# Patient Record
Sex: FEMALE | Race: WHITE | Employment: FULL TIME | URBAN - METROPOLITAN AREA
[De-identification: names, ages, dates, MRNs, and addresses within clinical notes are randomized per-mention and may not be internally consistent; named-entity substitution may affect disease eponyms.]

---

## 2017-07-27 ENCOUNTER — OFFICE VISIT (OUTPATIENT)
Dept: FAMILY MEDICINE CLINIC | Age: 32
End: 2017-07-27

## 2017-07-27 VITALS
HEIGHT: 67 IN | OXYGEN SATURATION: 99 % | SYSTOLIC BLOOD PRESSURE: 118 MMHG | HEART RATE: 77 BPM | WEIGHT: 130 LBS | BODY MASS INDEX: 20.4 KG/M2 | TEMPERATURE: 99.3 F | RESPIRATION RATE: 16 BRPM | DIASTOLIC BLOOD PRESSURE: 68 MMHG

## 2017-07-27 DIAGNOSIS — F41.0 PANIC ATTACK AS REACTION TO STRESS: ICD-10-CM

## 2017-07-27 DIAGNOSIS — F51.02 ADJUSTMENT INSOMNIA: ICD-10-CM

## 2017-07-27 DIAGNOSIS — F43.0 PANIC ATTACK AS REACTION TO STRESS: ICD-10-CM

## 2017-07-27 DIAGNOSIS — F43.22 ADJUSTMENT DISORDER WITH ANXIETY: Primary | ICD-10-CM

## 2017-07-27 PROCEDURE — 99202 OFFICE O/P NEW SF 15 MIN: CPT | Performed by: NURSE PRACTITIONER

## 2017-07-27 RX ORDER — BUSPIRONE HYDROCHLORIDE 10 MG/1
10 TABLET ORAL 3 TIMES DAILY PRN
Qty: 90 TABLET | Refills: 0 | Status: SHIPPED | OUTPATIENT
Start: 2017-07-27

## 2017-07-27 ASSESSMENT — ENCOUNTER SYMPTOMS
ABDOMINAL PAIN: 0
CONSTIPATION: 0
VISUAL CHANGE: 0

## 2017-07-27 ASSESSMENT — PATIENT HEALTH QUESTIONNAIRE - PHQ9
2. FEELING DOWN, DEPRESSED OR HOPELESS: 1
SUM OF ALL RESPONSES TO PHQ9 QUESTIONS 1 & 2: 1
SUM OF ALL RESPONSES TO PHQ QUESTIONS 1-9: 1
1. LITTLE INTEREST OR PLEASURE IN DOING THINGS: 0
SUM OF ALL RESPONSES TO PHQ QUESTIONS 1-9: 0
2. FEELING DOWN, DEPRESSED OR HOPELESS: 0
1. LITTLE INTEREST OR PLEASURE IN DOING THINGS: 0
SUM OF ALL RESPONSES TO PHQ9 QUESTIONS 1 & 2: 0

## 2024-04-30 NOTE — PROGRESS NOTES
Subjective   Patient ID: Adore Del Valle is a 38 y.o. female who presents for Establish Care.  HPI  Patient presents today to establish new care. Previous PCP was Dr. Lisa Musa. No longer goes there due to physician not in practice anymore. [place] about us from mother, sister, and everyone she works with.    Patient would like to have blood work done.     Patient thinks she may have high blood pressure.     Patient states she feels exhausted everyday. Patient states she can fall asleep in a pin drop and if she closes her eyes she will fall asleep instantly.    Would like to talk about going on a anxiety medication. Previously on Lexapro but patient took herself off of it.    Has no other new problem /question.    Review of Systems  Constitutional:  no chills, no fever and no night sweats.  Eyes: no blurred vision and no eyesight problems.  ENT: no hearing loss, no nasal congestion, no hoarseness and no sore throat.  Neck: no mass (es) and no swelling.  Cardiovascular: no chest pain, no intermittent leg claudication, no lower extremity edema, no palpitation and no syncope.  Respiratory: no cough, no shortness of breath during exertion, no shortness of breath at rest and no wheezing.  Gastrointestinal: no abdominal pain, no blood in stools, no constipation, no diarrhea, no melena, no nausea, no rectal pain and no vomiting.  Genitourinary: no dysuria, no change in urinary frequency, no urinary hesitancy and no feelings of urinary urgency.  Musculoskeletal: no arthralgias, no back pain and no myalgias.  Integumentary: no new skin lesions and no rashes.  Neurological: no difficulty walking, no headache, no limb weakness, no numbness and no tingling.  Psychiatric/Behavioral: no anxiety, no depression, no anhedonia and no substance use disorders.  Endocrine: no recent weight gain and no recent weight loss.  Hematologic/Lymphatic: no tendency for easy bruising and no swollen glands    Objective   Physical  Exam  Patient in [facility].  History of anxiety in the past patient is stable on current insomnia control treatment plan with no issues or significant side effects.  They understand the risks associated with chronic insomnia medication use and possibility of dependence.  They understand to use the insomnia medication only as directed and minimize its use when possible.  Current Treatment plan provides adequate insomnia control in order to maintain a stable   functional daily life.  Falls asleep has trouble staying asleep feels fatigued and tired all the time works in as a   on her feet constantly works a lot of hours  her  still involved they have 9-year-old twins he is involved in her and shared parenting.  Overall she is doing well had been on Lexapro in the past and then started not to work as well as been off it for a year feels like not sure if anxiety is worse just due to the lack of sleep.  She has not tried anything for sleep.  Exam shows a well-developed well-nourished thin female no acute distress.  Physical exam today's office visit constitutional alert and oriented x3.    Head is atraumatic HEENT is within normal limits.    Neck supple no masses full range of motion.    Thyroid is normal in size no thyromegaly there is no carotid bruits.    Pulmonary exam shows clear to auscultation no respiratory distress.    Cardiovascular shows no murmur rub or gallop.  Regular rate and rhythm.    Abdominal exam soft nontender no hepatosplenomegaly or masses normal bowel sounds no rebound no guarding.    Musculoskeletal exam no joint pain no muscle pain full range of motion.    Psych exam normal mood and affect.    Dermatologic exam no skin lesions no rash no blemishes.    Neuro exam is no focal deficits.  Normal exam.    Extremities no edema normal pulses normal capillary refill.  Plan is to get blood drawn and have her try over-the-counter melatonin up to 10 mg  /82 (BP  "Location: Left arm, Patient Position: Sitting)   Pulse 88   Temp 36.6 °C (97.9 °F) (Temporal)   Ht 1.702 m (5' 7\")   Wt 66.6 kg (146 lb 12.8 oz)   SpO2 99%   BMI 22.99 kg/m²     No results found for: \"WBC\", \"HGB\", \"HCT\", \"MCV\", \"PLT\"    Assessment/Plan if not improving may need to try prescription medication to help with sleep no evidence of sleep apnea at this point.  Follow-up when we have her blood test results we may need to get her started back on something for anxiety if is not improved after getting better sleep.  Problem List Items Addressed This Visit    None  Visit Diagnoses       Other fatigue        Need for lipid screening        BMI 22.0-22.9, adult                "

## 2024-05-01 ENCOUNTER — OFFICE VISIT (OUTPATIENT)
Dept: PRIMARY CARE | Facility: CLINIC | Age: 39
End: 2024-05-01
Payer: COMMERCIAL

## 2024-05-01 VITALS
SYSTOLIC BLOOD PRESSURE: 110 MMHG | HEART RATE: 88 BPM | TEMPERATURE: 97.9 F | OXYGEN SATURATION: 99 % | HEIGHT: 67 IN | BODY MASS INDEX: 23.04 KG/M2 | WEIGHT: 146.8 LBS | DIASTOLIC BLOOD PRESSURE: 82 MMHG

## 2024-05-01 DIAGNOSIS — Z13.220 NEED FOR LIPID SCREENING: ICD-10-CM

## 2024-05-01 DIAGNOSIS — R53.83 OTHER FATIGUE: ICD-10-CM

## 2024-05-01 PROCEDURE — 99213 OFFICE O/P EST LOW 20 MIN: CPT | Performed by: FAMILY MEDICINE

## 2024-05-01 PROCEDURE — 1036F TOBACCO NON-USER: CPT | Performed by: FAMILY MEDICINE

## 2024-05-01 PROCEDURE — 3008F BODY MASS INDEX DOCD: CPT | Performed by: FAMILY MEDICINE

## 2024-05-01 ASSESSMENT — PATIENT HEALTH QUESTIONNAIRE - PHQ9
2. FEELING DOWN, DEPRESSED OR HOPELESS: NOT AT ALL
1. LITTLE INTEREST OR PLEASURE IN DOING THINGS: NOT AT ALL
SUM OF ALL RESPONSES TO PHQ9 QUESTIONS 1 AND 2: 0

## 2024-05-06 ENCOUNTER — LAB (OUTPATIENT)
Dept: LAB | Facility: LAB | Age: 39
End: 2024-05-06
Payer: COMMERCIAL

## 2024-05-06 ENCOUNTER — TELEPHONE (OUTPATIENT)
Dept: PRIMARY CARE | Facility: CLINIC | Age: 39
End: 2024-05-06

## 2024-05-06 DIAGNOSIS — R53.83 OTHER FATIGUE: ICD-10-CM

## 2024-05-06 DIAGNOSIS — Z13.220 NEED FOR LIPID SCREENING: ICD-10-CM

## 2024-05-06 DIAGNOSIS — D64.9 ANEMIA, UNSPECIFIED TYPE: ICD-10-CM

## 2024-05-06 LAB
ALBUMIN SERPL BCP-MCNC: 4.4 G/DL (ref 3.4–5)
ALP SERPL-CCNC: 42 U/L (ref 33–110)
ALT SERPL W P-5'-P-CCNC: 10 U/L (ref 7–45)
ANION GAP SERPL CALC-SCNC: 10 MMOL/L (ref 10–20)
AST SERPL W P-5'-P-CCNC: 13 U/L (ref 9–39)
BASOPHILS # BLD AUTO: 0.05 X10*3/UL (ref 0–0.1)
BASOPHILS NFR BLD AUTO: 0.6 %
BILIRUB SERPL-MCNC: 0.6 MG/DL (ref 0–1.2)
BUN SERPL-MCNC: 10 MG/DL (ref 6–23)
CALCIUM SERPL-MCNC: 9.5 MG/DL (ref 8.6–10.3)
CHLORIDE SERPL-SCNC: 108 MMOL/L (ref 98–107)
CHOLEST SERPL-MCNC: 183 MG/DL (ref 0–199)
CHOLESTEROL/HDL RATIO: 2.8
CO2 SERPL-SCNC: 27 MMOL/L (ref 21–32)
CREAT SERPL-MCNC: 0.61 MG/DL (ref 0.5–1.05)
EGFRCR SERPLBLD CKD-EPI 2021: >90 ML/MIN/1.73M*2
EOSINOPHIL # BLD AUTO: 0.09 X10*3/UL (ref 0–0.7)
EOSINOPHIL NFR BLD AUTO: 1.1 %
ERYTHROCYTE [DISTWIDTH] IN BLOOD BY AUTOMATED COUNT: 15.6 % (ref 11.5–14.5)
FOLATE SERPL-MCNC: 9.2 NG/ML
GLUCOSE SERPL-MCNC: 97 MG/DL (ref 74–99)
HCT VFR BLD AUTO: 38.1 % (ref 36–46)
HDLC SERPL-MCNC: 64.5 MG/DL
HGB BLD-MCNC: 11.7 G/DL (ref 12–16)
IMM GRANULOCYTES # BLD AUTO: 0.03 X10*3/UL (ref 0–0.7)
IMM GRANULOCYTES NFR BLD AUTO: 0.4 % (ref 0–0.9)
IRON SATN MFR SERPL: 11 % (ref 25–45)
IRON SERPL-MCNC: 43 UG/DL (ref 35–150)
LDLC SERPL CALC-MCNC: 110 MG/DL
LYMPHOCYTES # BLD AUTO: 1.85 X10*3/UL (ref 1.2–4.8)
LYMPHOCYTES NFR BLD AUTO: 23.1 %
MCH RBC QN AUTO: 25.2 PG (ref 26–34)
MCHC RBC AUTO-ENTMCNC: 30.7 G/DL (ref 32–36)
MCV RBC AUTO: 82 FL (ref 80–100)
MONOCYTES # BLD AUTO: 0.44 X10*3/UL (ref 0.1–1)
MONOCYTES NFR BLD AUTO: 5.5 %
NEUTROPHILS # BLD AUTO: 5.56 X10*3/UL (ref 1.2–7.7)
NEUTROPHILS NFR BLD AUTO: 69.3 %
NON HDL CHOLESTEROL: 119 MG/DL (ref 0–149)
NRBC BLD-RTO: 0 /100 WBCS (ref 0–0)
PLATELET # BLD AUTO: 395 X10*3/UL (ref 150–450)
POTASSIUM SERPL-SCNC: 4.5 MMOL/L (ref 3.5–5.3)
PROT SERPL-MCNC: 7.1 G/DL (ref 6.4–8.2)
RBC # BLD AUTO: 4.64 X10*6/UL (ref 4–5.2)
SODIUM SERPL-SCNC: 140 MMOL/L (ref 136–145)
T4 FREE SERPL-MCNC: 0.66 NG/DL (ref 0.61–1.12)
TIBC SERPL-MCNC: 397 UG/DL (ref 240–445)
TRIGL SERPL-MCNC: 42 MG/DL (ref 0–149)
UIBC SERPL-MCNC: 354 UG/DL (ref 110–370)
VIT B12 SERPL-MCNC: 451 PG/ML (ref 211–911)
VLDL: 8 MG/DL (ref 0–40)
WBC # BLD AUTO: 8 X10*3/UL (ref 4.4–11.3)

## 2024-05-06 PROCEDURE — 85025 COMPLETE CBC W/AUTO DIFF WBC: CPT

## 2024-05-06 PROCEDURE — 36415 COLL VENOUS BLD VENIPUNCTURE: CPT

## 2024-05-06 PROCEDURE — 82607 VITAMIN B-12: CPT

## 2024-05-06 PROCEDURE — 82746 ASSAY OF FOLIC ACID SERUM: CPT

## 2024-05-06 PROCEDURE — 80061 LIPID PANEL: CPT

## 2024-05-06 PROCEDURE — 83540 ASSAY OF IRON: CPT

## 2024-05-06 PROCEDURE — 83550 IRON BINDING TEST: CPT

## 2024-05-06 PROCEDURE — 84439 ASSAY OF FREE THYROXINE: CPT

## 2024-05-06 PROCEDURE — 80053 COMPREHEN METABOLIC PANEL: CPT

## 2024-05-06 NOTE — TELEPHONE ENCOUNTER
----- Message from Jerel Bravo MD sent at 5/6/2024  3:51 PM EDT -----  Labs normal except for mild anemia.  She needs an iron level B12 level and folate level drawn

## 2024-05-07 ENCOUNTER — TELEPHONE (OUTPATIENT)
Dept: PRIMARY CARE | Facility: CLINIC | Age: 39
End: 2024-05-07
Payer: COMMERCIAL

## 2024-05-07 DIAGNOSIS — D64.9 ANEMIA, UNSPECIFIED TYPE: ICD-10-CM

## 2024-05-07 NOTE — TELEPHONE ENCOUNTER
Patient aware.     ----- Message from Jerel Bravo MD sent at 5/7/2024  9:03 AM EDT -----  Labs normal except for mild anemia.  Iron B12 and folate levels normal.  This may be related.  Blood loss especially if she has every periods and short cycles.  Recommend recheck CBC with differential and 2 months.

## 2024-05-16 PROBLEM — D64.9 ANEMIA: Status: ACTIVE | Noted: 2024-05-16

## 2024-05-16 NOTE — PROGRESS NOTES
Subjective   Patient ID: Adore Del Valle is a 38 y.o. female who presents for Anxiety.  HPI      Patient presents today complaining of Anxiety. This is chronic issue.  In the past was on Lexapro. Patient states it did help for a little while but would like to go on something different. Patient states once the dose was increased she felt zoned out all the time.  Wants to discuss going back on medication for anxiety.     Patient was seen on 5/6/24 and Dr. Bravo would like to follow up on anxiety if not improved after better sleep. Patient states her sleeping pattern has been a little better since starting Melatonin as advised last time. Patient states she does not stay asleep.     Has no other new problem /question.    Review of Systems  Constitutional:  no chills, no fever and no night sweats.  Eyes: no blurred vision and no eyesight problems.  ENT: no hearing loss, no nasal congestion, no hoarseness and no sore throat.  Neck: no mass (es) and no swelling.  Cardiovascular: no chest pain, no intermittent leg claudication, no lower extremity edema, no palpitation and no syncope.  Respiratory: no cough, no shortness of breath during exertion, no shortness of breath at rest and no wheezing.  Gastrointestinal: no abdominal pain, no blood in stools, no constipation, no diarrhea, no melena, no nausea, no rectal pain and no vomiting.  Genitourinary: no dysuria, no change in urinary frequency, no urinary hesitancy and no feelings of urinary urgency.  Musculoskeletal: no arthralgias, no back pain and no myalgias.  Integumentary: no new skin lesions and no rashes.  Neurological: no difficulty walking, no headache, no limb weakness, no numbness and no tingling.  Psychiatric/Behavioral: no anxiety, no depression, no anhedonia and no substance use disorders.  Endocrine: no recent weight gain and no recent weight loss.  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  They are all going to [place] next week and she has never flown before she is anxious about getting on a plane and having a panic attack.  Well-developed well-nourished woman in no acute distress had a long discussion about anxiety she had been on Lexapro in the past it helped with her anxiety but made her foggy headed and just like to try something different.  She would also like to have something in case she gets extremely anxious while waiting for the medication to kick in.  /78 (BP Location: Left arm, Patient Position: Sitting)   Pulse 83   Temp 36.2 °C (97.2 °F) (Temporal)   Ht 1.702 m (5' 7\")   Wt 65.3 kg (144 lb)   SpO2 99%   BMI 22.55 kg/m²     Lab Results   Component Value Date    WBC 8.0 05/06/2024    HGB 11.7 (L) 05/06/2024    HCT 38.1 05/06/2024    MCV 82 05/06/2024     05/06/2024       Assessment/Plan plan is to start 10 mg of Paxil nightly Valium 5 every 12 hours as necessary for acute anxiety she will follow-up with us in 3 weeks to see how she is doing.  Problem List Items Addressed This Visit          Hematology and Neoplasia    Anemia     Other Visit Diagnoses       Anxiety    -  Primary    Relevant Medications    PARoxetine (Paxil) 10 mg tablet    diazePAM (Valium) 5 mg tablet    BMI 22.0-22.9, adult                "

## 2024-05-20 ENCOUNTER — OFFICE VISIT (OUTPATIENT)
Dept: PRIMARY CARE | Facility: CLINIC | Age: 39
End: 2024-05-20
Payer: COMMERCIAL

## 2024-05-20 VITALS
DIASTOLIC BLOOD PRESSURE: 78 MMHG | TEMPERATURE: 97.2 F | WEIGHT: 144 LBS | BODY MASS INDEX: 22.6 KG/M2 | OXYGEN SATURATION: 99 % | SYSTOLIC BLOOD PRESSURE: 116 MMHG | HEART RATE: 83 BPM | HEIGHT: 67 IN

## 2024-05-20 DIAGNOSIS — D64.9 ANEMIA, UNSPECIFIED TYPE: ICD-10-CM

## 2024-05-20 DIAGNOSIS — F41.9 ANXIETY: Primary | ICD-10-CM

## 2024-05-20 PROCEDURE — 99213 OFFICE O/P EST LOW 20 MIN: CPT | Performed by: FAMILY MEDICINE

## 2024-05-20 PROCEDURE — 3008F BODY MASS INDEX DOCD: CPT | Performed by: FAMILY MEDICINE

## 2024-05-20 PROCEDURE — 1036F TOBACCO NON-USER: CPT | Performed by: FAMILY MEDICINE

## 2024-05-20 RX ORDER — PAROXETINE 10 MG/1
10 TABLET, FILM COATED ORAL EVERY MORNING
Qty: 30 TABLET | Refills: 5 | Status: SHIPPED | OUTPATIENT
Start: 2024-05-20 | End: 2024-11-16

## 2024-05-20 RX ORDER — DIAZEPAM 5 MG/1
5 TABLET ORAL 2 TIMES DAILY PRN
Qty: 30 TABLET | Refills: 1 | Status: SHIPPED | OUTPATIENT
Start: 2024-05-20 | End: 2024-06-04

## 2024-05-20 ASSESSMENT — PATIENT HEALTH QUESTIONNAIRE - PHQ9
1. LITTLE INTEREST OR PLEASURE IN DOING THINGS: NOT AT ALL
2. FEELING DOWN, DEPRESSED OR HOPELESS: NOT AT ALL
SUM OF ALL RESPONSES TO PHQ9 QUESTIONS 1 AND 2: 0

## 2024-09-18 ENCOUNTER — APPOINTMENT (OUTPATIENT)
Dept: PRIMARY CARE | Facility: CLINIC | Age: 39
End: 2024-09-18
Payer: COMMERCIAL

## 2025-01-17 DIAGNOSIS — F41.9 ANXIETY: ICD-10-CM

## 2025-01-17 RX ORDER — PAROXETINE 10 MG/1
10 TABLET, FILM COATED ORAL EVERY MORNING
Qty: 30 TABLET | Refills: 2 | Status: SHIPPED | OUTPATIENT
Start: 2025-01-17 | End: 2025-07-16

## 2025-01-17 NOTE — TELEPHONE ENCOUNTER
LL  Hello!  My Paxil has no more refills and I only have 2 days left. Can I get a refill sent over to my pharmacy?     Thanks so much!

## 2025-05-01 ENCOUNTER — OFFICE VISIT (OUTPATIENT)
Dept: PRIMARY CARE | Facility: CLINIC | Age: 40
End: 2025-05-01
Payer: COMMERCIAL

## 2025-05-01 VITALS
TEMPERATURE: 97.9 F | HEIGHT: 67 IN | DIASTOLIC BLOOD PRESSURE: 82 MMHG | WEIGHT: 137 LBS | SYSTOLIC BLOOD PRESSURE: 138 MMHG | BODY MASS INDEX: 21.5 KG/M2 | OXYGEN SATURATION: 99 % | HEART RATE: 102 BPM

## 2025-05-01 DIAGNOSIS — I47.10 PSVT (PAROXYSMAL SUPRAVENTRICULAR TACHYCARDIA) (CMS-HCC): Primary | ICD-10-CM

## 2025-05-01 PROCEDURE — 1036F TOBACCO NON-USER: CPT | Performed by: FAMILY MEDICINE

## 2025-05-01 PROCEDURE — 3008F BODY MASS INDEX DOCD: CPT | Performed by: FAMILY MEDICINE

## 2025-05-01 PROCEDURE — 99213 OFFICE O/P EST LOW 20 MIN: CPT | Performed by: FAMILY MEDICINE

## 2025-05-01 RX ORDER — METOPROLOL SUCCINATE 25 MG/1
25 TABLET, EXTENDED RELEASE ORAL DAILY
Qty: 30 TABLET | Refills: 5 | Status: SHIPPED | OUTPATIENT
Start: 2025-05-01 | End: 2025-10-28

## 2025-05-01 ASSESSMENT — PATIENT HEALTH QUESTIONNAIRE - PHQ9
SUM OF ALL RESPONSES TO PHQ9 QUESTIONS 1 AND 2: 0
2. FEELING DOWN, DEPRESSED OR HOPELESS: NOT AT ALL
1. LITTLE INTEREST OR PLEASURE IN DOING THINGS: NOT AT ALL

## 2025-05-01 NOTE — PROGRESS NOTES
Subjective   Patient ID: Adore Del Valle is a 39 y.o. female who presents for No chief complaint on file..  HPI  Patient presents in the office today with concerns of tachycardia. Patient states her HR reached 198 today. Pt HR in office is 102. Pt used to have problems with it before and was on beta blockers. Pt Previous Cardiologist passed away. Pt drinks a lot of energy drinks. Pt had this issue since she was in highschool but hasn't been on meds in a while. Pt does not have a cardiologist. Ongoing X years but getting worse. Has tried eating and drinking and calming down.  Review of Systems  Constitutional:  no chills, no fever and no night sweats.  Eyes: no blurred vision and no eyesight problems.  ENT: no hearing loss, no nasal congestion, no hoarseness and no sore throat.  Neck: no mass (es) and no swelling.  Cardiovascular: no chest pain, no intermittent leg claudication, no lower extremity edema, no palpitation and no syncope.  Respiratory: no cough, no shortness of breath during exertion, no shortness of breath at rest and no wheezing.  Gastrointestinal: no abdominal pain, no blood in stools, no constipation, no diarrhea, no melena, no nausea, no rectal pain and no vomiting.  Genitourinary: no dysuria, no change in urinary frequency, no urinary hesitancy and no feelings of urinary urgency.  Musculoskeletal: no arthralgias, no back pain and no myalgias.  Integumentary: no new skin lesions and no rashes.  Neurological: no difficulty walking, no headache, no limb weakness, no numbness and no tingling.  Psychiatric/Behavioral: no anxiety, no depression, no anhedonia and no substance use disorders.  Endocrine: no recent weight gain and no recent weight loss.  Hematologic/Lymphatic: no tendency for easy bruising and no swollen glands    Objective   Physical Exam  Seen today with complaints of palpitations she had what sounds like PSVT as a teenager it went away over the past year she is having more more  episodes of heart racing randomly she would usually get them once or twice a year not Much more often she had 2 episodes today and she is never had 2 episodes in a single day and her heart rate went up to 190+ when she was at work her boss has A-fib and he has a monitor to check heart rate.  First episode was early in the morning for she left work put her make-up on calm no exertion lasted for about 5 minutes and went away episode at work lasted for at least 20 minutes it was much more anxiety provoking.  She does admit to drinking a lot of high-powered energy drinks a lot of caffeine.  Had a long discussion about PSVT accessory bundles possible beta-blocker versus radiofrequency ablation as treatment we will start her on Toprol metoprolol XL 25 mg daily to see if we can minimize any further episodes will contact cardiology to get her seen as soon as possible and await their evaluations in the meantime she is also told to avoid caffeine completely it may be that that is all she needs to do will wait cardiology's advice.  There were no vitals taken for this visit.    Lab Results   Component Value Date    WBC 8.0 05/06/2024    HGB 11.7 (L) 05/06/2024    HCT 38.1 05/06/2024    MCV 82 05/06/2024     05/06/2024       Assessment/Plan   Problem List Items Addressed This Visit    None

## 2025-05-06 ENCOUNTER — APPOINTMENT (OUTPATIENT)
Dept: PRIMARY CARE | Facility: CLINIC | Age: 40
End: 2025-05-06
Payer: COMMERCIAL

## 2025-05-06 ENCOUNTER — APPOINTMENT (OUTPATIENT)
Dept: CARDIOLOGY | Facility: CLINIC | Age: 40
End: 2025-05-06
Payer: COMMERCIAL

## 2025-05-06 VITALS
HEIGHT: 67 IN | BODY MASS INDEX: 21.79 KG/M2 | WEIGHT: 138.8 LBS | SYSTOLIC BLOOD PRESSURE: 132 MMHG | DIASTOLIC BLOOD PRESSURE: 82 MMHG | HEART RATE: 82 BPM

## 2025-05-06 DIAGNOSIS — I47.10 PSVT (PAROXYSMAL SUPRAVENTRICULAR TACHYCARDIA) (CMS-HCC): ICD-10-CM

## 2025-05-06 DIAGNOSIS — R42 LIGHTHEADEDNESS: Primary | ICD-10-CM

## 2025-05-06 DIAGNOSIS — R00.2 PALPITATIONS: ICD-10-CM

## 2025-05-06 DIAGNOSIS — R06.02 SHORTNESS OF BREATH: ICD-10-CM

## 2025-05-06 PROCEDURE — 99215 OFFICE O/P EST HI 40 MIN: CPT | Performed by: NURSE PRACTITIONER

## 2025-05-06 PROCEDURE — 1036F TOBACCO NON-USER: CPT | Performed by: NURSE PRACTITIONER

## 2025-05-06 PROCEDURE — 93000 ELECTROCARDIOGRAM COMPLETE: CPT | Performed by: INTERNAL MEDICINE

## 2025-05-06 PROCEDURE — 3008F BODY MASS INDEX DOCD: CPT | Performed by: NURSE PRACTITIONER

## 2025-05-06 NOTE — PROGRESS NOTES
Chief Complaint:  Chief Complaint   Patient presents with    New Patient Visit     Pt states she is having palpations. She was on BB as teenager for same symptoms.       Adore Del Valle is a 39 y.o. female that presents to the office today for new patient cardiac evaluation. She was referred by her primary care physician Dr. Bravo for palpitations/tachycardia. She has a PMH of fatigue, palpitations.  She denies tobacco use, vaping or recreational drug use.  Admits to occasional/social alcohol use. She admits to consuming 3 cups coffee daily along with 1-2 energy drinks daily.  She does admit to drinking water/staying well hydrated. Reports eating healthy.  Exercises on a regular basis.  She is employed full time as a .  She has 11 year old twins. Family history positive for father with Cad/MI x 2, Mother HTN, HLD, sister HLD.    She reports that she first started having palpitations in high school with heart rates 160's while sleeping.  She was placed on a beta blocker at that time, eventually her palpitations stopped and she stopped the beta blocker due to fatigue.      She reports that her palpitations have started reoccurring are feels that they are becoming more frequent and lasting for longer periods of time.  She states that last Thursday her HR was 198 bpm per her watch and another occasion 183 bpm.  She states previous her palpitations lasted 5-10 minutes and now feels they can last up to 2 hours. She does report shortness of breath and dizziness with palpitations.   She was recently started on metoprolol by her PCP which she has not started yet. She as been advised to take a metoprolol if palpitations/tachycardia reoccur.         Testing Reviewed  EKG obtained in the office today and verified with Dr. Emanuel shows NSR, possible left atrial enlargement, HR 82  bpm, QT/Qtc 372/434.     CBC:   Lab Results   Component Value Date    WBC 8.0 05/06/2024    RBC 4.64 05/06/2024    HGB 11.7 (L)  05/06/2024    HCT 38.1 05/06/2024     05/06/2024        CMP:    Lab Results   Component Value Date     05/06/2024    K 4.5 05/06/2024     (H) 05/06/2024    CO2 27 05/06/2024    BUN 10 05/06/2024    CREATININE 0.61 05/06/2024    GLUCOSE 97 05/06/2024    CALCIUM 9.5 05/06/2024       Lipid Profile:    Lab Results   Component Value Date    TRIG 42 05/06/2024    HDL 64.5 05/06/2024    LDLCALC 110 (H) 05/06/2024       BMP:  Lab Results   Component Value Date     05/06/2024    K 4.5 05/06/2024     (H) 05/06/2024    CO2 27 05/06/2024    BUN 10 05/06/2024    CREATININE 0.61 05/06/2024       CBC:  Lab Results   Component Value Date    WBC 8.0 05/06/2024    RBC 4.64 05/06/2024    HGB 11.7 (L) 05/06/2024    HCT 38.1 05/06/2024    MCV 82 05/06/2024    MCH 25.2 (L) 05/06/2024    MCHC 30.7 (L) 05/06/2024    RDW 15.6 (H) 05/06/2024     05/06/2024       Hepatic Function Panel:    Lab Results   Component Value Date    ALKPHOS 42 05/06/2024    ALT 10 05/06/2024    AST 13 05/06/2024    PROT 7.1 05/06/2024    BILITOT 0.6 05/06/2024         Problem List[1]    Social History[2]    Medical History[3]    Current Medications[4]    Cephalosporins and Propoxyphene n-acetaminophen    Family History[5]    Surgical History[6]       Review of systems  Constitutional: No weight loss, fever, chills, weakness or fatigue  HEENT: No visual loss, blurred vision, double vision or yellow sclerae  Skin: No rash or itching  Cardiovascular: No chest pain, pressure or discomfort,  edema. Positive for palpitations.   Respiratory: Positive for shortness of breath with palpitations, denies cough or sputum  Gastrointestinal: No nausea, vomiting or diarrhea. No bloody or dark tarry stools.  Neurological: No headache, syncope. Positive for lightheadedness, dizziness with palpitations.   Musculoskeletal: No muscle, back pain, joint pain or stiffness.  Hematologic: No anemia, bleeding or bruising.    /82 (BP Location: Left  "arm, Patient Position: Sitting)   Pulse 82   Ht 1.702 m (5' 7\")   Wt 63 kg (138 lb 12.8 oz)   BMI 21.74 kg/m²     Physical Exam  Constitutional: Well developed, awake/alert x 3, no distress.  Head/Neck: No JVD, No bruits  Respiratory/Thorax: patent airways, CTAB, normal breath sounds with good expansion.  Cardiovascular: Regular rate and rhythm, no murmurs, normal S1 and S2,   Gastrointestinal: Non distended, soft, non-tender, no rebound tenderness or guarding.  Extremities: No cyanosis, edema.   Neurological: Alert and oriented x 3. Moves extremities spontaneous with purpose.  Psychological: Appropriate mood and behavior  Skin: Warm and Dry. No lesions or rashes.     Assessment/Plan  Palpitations/tachycardia:  obtain 7 day charlie of [employer] monitor to assess for atrial/ventricular arrhythmias.   Shortness of breath: Obtain echocardiogram to assess for valve and pump function.   She has been advised to STOP energy drinks.  Hydration has been emphasized.   Obtain labs:  CMP, CBC, Lipid panel, Mag, TSH, Free T4  Follow in office after testing for results and further recommendations.           Please excuse any errors in grammar or translation related to dictation, voice recognition software was used to prepare this document.         [1]   Patient Active Problem List  Diagnosis    Anemia   [2]   Social History  Tobacco Use    Smoking status: Never    Smokeless tobacco: Never   Vaping Use    Vaping status: Never Used   Substance Use Topics    Alcohol use: Not Currently     Comment: rarely    Drug use: Never   [3]   Past Medical History:  Diagnosis Date    Personal history of other diseases of the digestive system 01/22/2018    History of umbilical hernia    Tachycardia, unspecified     Tachyarrhythmia   [4]   Current Outpatient Medications:     diazePAM (Valium) 5 mg tablet, Take 1 tablet (5 mg) by mouth 2 times a day as needed for anxiety, sleep or muscle spasms for up to 15 days., Disp: 30 tablet, Rfl: 1    " PARoxetine (Paxil) 10 mg tablet, Take 1 tablet (10 mg) by mouth once daily in the morning., Disp: 30 tablet, Rfl: 2    metoprolol succinate XL (Toprol-XL) 25 mg 24 hr tablet, Take 1 tablet (25 mg) by mouth once daily. Do not crush or chew. (Patient not taking: Reported on 5/6/2025), Disp: 30 tablet, Rfl: 5  [5]   Family History  Problem Relation Name Age of Onset    Hyperlipidemia Mother      Hypertension Mother      Heart attack Father      Thyroid disease Sister      Hyperlipidemia Sister      Hypertension Sister      Diabetes type II Maternal Grandmother      Heart attack Paternal Grandfather     [6]   Past Surgical History:  Procedure Laterality Date    APPENDECTOMY  10/02/2017    Appendectomy    UMBILICAL HERNIA REPAIR  03/09/2018    Umbilical Hernia Repair

## 2025-05-06 NOTE — PATIENT INSTRUCTIONS
You will be scheduled for a heart monitor to be applied.  On the day you come in to have the monitor applied, please shower prior to coming in and do not apply any creams, lotions or powders to your chest prior to coming in on the day your monitor is scheduled to be applied.      Please try to increase your water intake, you should try to drink 6 - 8 glasses/bottles of water per day to stay well hydrated.      Have labs drawn today.

## 2025-05-07 LAB
ALBUMIN SERPL-MCNC: 4.5 G/DL (ref 3.6–5.1)
ALP SERPL-CCNC: 55 U/L (ref 31–125)
ALT SERPL-CCNC: 11 U/L (ref 6–29)
ANION GAP SERPL CALCULATED.4IONS-SCNC: 10 MMOL/L (CALC) (ref 7–17)
AST SERPL-CCNC: 13 U/L (ref 10–30)
BILIRUB SERPL-MCNC: 0.4 MG/DL (ref 0.2–1.2)
BUN SERPL-MCNC: 11 MG/DL (ref 7–25)
CALCIUM SERPL-MCNC: 9.5 MG/DL (ref 8.6–10.2)
CHLORIDE SERPL-SCNC: 105 MMOL/L (ref 98–110)
CHOLEST SERPL-MCNC: 170 MG/DL
CHOLEST/HDLC SERPL: 3.1 (CALC)
CO2 SERPL-SCNC: 26 MMOL/L (ref 20–32)
CREAT SERPL-MCNC: 0.56 MG/DL (ref 0.5–0.97)
EGFRCR SERPLBLD CKD-EPI 2021: 119 ML/MIN/1.73M2
ERYTHROCYTE [DISTWIDTH] IN BLOOD BY AUTOMATED COUNT: 14.9 % (ref 11–15)
GLUCOSE SERPL-MCNC: 85 MG/DL (ref 65–139)
HCT VFR BLD AUTO: 36.6 % (ref 35–45)
HDLC SERPL-MCNC: 54 MG/DL
HGB BLD-MCNC: 11.1 G/DL (ref 11.7–15.5)
LDLC SERPL CALC-MCNC: 100 MG/DL (CALC)
MAGNESIUM SERPL-MCNC: 2.2 MG/DL (ref 1.5–2.5)
MCH RBC QN AUTO: 23.8 PG (ref 27–33)
MCHC RBC AUTO-ENTMCNC: 30.3 G/DL (ref 32–36)
MCV RBC AUTO: 78.5 FL (ref 80–100)
NONHDLC SERPL-MCNC: 116 MG/DL (CALC)
PLATELET # BLD AUTO: 496 THOUSAND/UL (ref 140–400)
PMV BLD REES-ECKER: 10.1 FL (ref 7.5–12.5)
POTASSIUM SERPL-SCNC: 4.2 MMOL/L (ref 3.5–5.3)
PROT SERPL-MCNC: 7.1 G/DL (ref 6.1–8.1)
RBC # BLD AUTO: 4.66 MILLION/UL (ref 3.8–5.1)
SODIUM SERPL-SCNC: 141 MMOL/L (ref 135–146)
T4 FREE SERPL-MCNC: 0.9 NG/DL (ref 0.8–1.8)
TRIGL SERPL-MCNC: 74 MG/DL
TSH SERPL-ACNC: 1.22 MIU/L
WBC # BLD AUTO: 6.9 THOUSAND/UL (ref 3.8–10.8)

## 2025-05-08 ENCOUNTER — TELEPHONE (OUTPATIENT)
Dept: CARDIOLOGY | Facility: CLINIC | Age: 40
End: 2025-05-08
Payer: COMMERCIAL

## 2025-05-13 ENCOUNTER — TELEPHONE (OUTPATIENT)
Dept: CARDIOLOGY | Facility: CLINIC | Age: 40
End: 2025-05-13
Payer: COMMERCIAL

## 2025-05-13 NOTE — TELEPHONE ENCOUNTER
Received fax from [employer] requesting additional diagnosis to cover TSH order.   Form placed in your basket to review.

## 2025-05-20 ENCOUNTER — ANCILLARY PROCEDURE (OUTPATIENT)
Dept: CARDIOLOGY | Facility: HOSPITAL | Age: 40
End: 2025-05-20
Payer: COMMERCIAL

## 2025-05-20 DIAGNOSIS — R42 LIGHTHEADEDNESS: ICD-10-CM

## 2025-05-20 DIAGNOSIS — R00.2 PALPITATIONS: ICD-10-CM

## 2025-05-20 DIAGNOSIS — I47.10 PSVT (PAROXYSMAL SUPRAVENTRICULAR TACHYCARDIA): ICD-10-CM

## 2025-05-20 DIAGNOSIS — R06.02 SHORTNESS OF BREATH: ICD-10-CM

## 2025-05-20 LAB
AORTIC VALVE MEAN GRADIENT: 3 MMHG
AORTIC VALVE PEAK VELOCITY: 1.17 M/S
AV PEAK GRADIENT: 5 MMHG
AVA (PEAK VEL): 4.25 CM2
AVA (VTI): 4.15 CM2
EJECTION FRACTION APICAL 4 CHAMBER: 52.8
EJECTION FRACTION: 63 %
LEFT VENTRICLE INTERNAL DIMENSION DIASTOLE: 3.86 CM (ref 3.5–6)
LEFT VENTRICULAR OUTFLOW TRACT DIAMETER: 2.4 CM
LV EJECTION FRACTION BIPLANE: 61 %
MITRAL VALVE E/A RATIO: 1.68
MITRAL VALVE E/E' RATIO: 5.5
RIGHT VENTRICLE FREE WALL PEAK S': 12.6 CM/S
RIGHT VENTRICLE PEAK SYSTOLIC PRESSURE: 27.6 MMHG
TRICUSPID ANNULAR PLANE SYSTOLIC EXCURSION: 2.3 CM

## 2025-05-20 PROCEDURE — 93306 TTE W/DOPPLER COMPLETE: CPT

## 2025-05-20 PROCEDURE — 93306 TTE W/DOPPLER COMPLETE: CPT | Performed by: INTERNAL MEDICINE

## 2025-05-29 DIAGNOSIS — F41.9 ANXIETY: ICD-10-CM

## 2025-05-29 RX ORDER — PAROXETINE 10 MG/1
10 TABLET, FILM COATED ORAL EVERY MORNING
Qty: 90 TABLET | Refills: 1 | Status: SHIPPED | OUTPATIENT
Start: 2025-05-29 | End: 2025-11-25

## 2025-05-29 NOTE — TELEPHONE ENCOUNTER
Patient is calling to request a RX refill on her Paxil 10 mg    Rx Refill Request Telephone Encounter    Name:  Adore Del Valle  : 1985     Medication Name:  Paxil  Dose (Optional):    10 mg  Quantity (Optional):    30 tablet (30 day supply)  Directions (Optional):   Take 1 tablet (10 mg) by mouth once daily in the morning.     ALLERGIES:   see list     Specific Pharmacy location:  [facility]    Date of last appointment:  25  Date of next appointment:  none     Best number to reach patient:  [phone]

## 2025-06-12 ENCOUNTER — APPOINTMENT (OUTPATIENT)
Dept: CARDIOLOGY | Facility: HOSPITAL | Age: 40
End: 2025-06-12
Payer: COMMERCIAL

## 2025-06-12 ENCOUNTER — APPOINTMENT (OUTPATIENT)
Dept: CARDIOLOGY | Facility: CLINIC | Age: 40
End: 2025-06-12
Payer: COMMERCIAL

## 2025-06-12 DIAGNOSIS — I47.10 PSVT (PAROXYSMAL SUPRAVENTRICULAR TACHYCARDIA): ICD-10-CM

## 2025-06-12 DIAGNOSIS — R00.2 PALPITATIONS: ICD-10-CM

## 2025-06-12 DIAGNOSIS — R42 LIGHTHEADEDNESS: ICD-10-CM

## 2025-06-12 DIAGNOSIS — R06.02 SHORTNESS OF BREATH: ICD-10-CM

## 2025-07-07 ENCOUNTER — APPOINTMENT (OUTPATIENT)
Dept: CARDIOLOGY | Facility: CLINIC | Age: 40
End: 2025-07-07
Payer: COMMERCIAL

## 2025-07-07 VITALS — DIASTOLIC BLOOD PRESSURE: 80 MMHG | SYSTOLIC BLOOD PRESSURE: 136 MMHG | HEART RATE: 73 BPM

## 2025-07-07 DIAGNOSIS — R42 LIGHTHEADEDNESS: ICD-10-CM

## 2025-07-07 DIAGNOSIS — I47.10 PSVT (PAROXYSMAL SUPRAVENTRICULAR TACHYCARDIA): ICD-10-CM

## 2025-07-07 DIAGNOSIS — R00.2 PALPITATIONS: ICD-10-CM

## 2025-07-07 DIAGNOSIS — R06.02 SHORTNESS OF BREATH: ICD-10-CM

## 2025-07-07 PROCEDURE — 99214 OFFICE O/P EST MOD 30 MIN: CPT | Performed by: NURSE PRACTITIONER

## 2025-07-07 PROCEDURE — 93248 EXT ECG>7D<15D REV&INTERPJ: CPT | Performed by: INTERNAL MEDICINE

## 2025-07-07 PROCEDURE — 1036F TOBACCO NON-USER: CPT | Performed by: NURSE PRACTITIONER

## 2025-07-07 NOTE — PROGRESS NOTES
Chief Complaint:  Chief Complaint   Patient presents with    Results       Adore Del Valle is a 40 y.o. female that presents to the office today with her children for  cardiac follow up/testing results.  She was seen by myself at a new patient consult 5/6/2025 for palpitations. She has a PMH of fatigue, palpitations.  Had been started on beta blocker by  her PCP which she did not start.  Echocardiogram and event monitor were ordered at that time.     Testing results were reviewed and discussed as noted below.  She states that she has stopped all energy drinks and feels much better since stopping and reports her palpitations have resolved.  Denies chest pain, chest pressure, shortness of breath.  We; will continue to monitor for reoccurrence of palpitations/tachycardia.  If reoccur, will need to discuss starting beta blocker.       Testing Reviewed  5/20/2025 Echocardiogram   1. Normal Echocardiogram.   2. The left ventricular systolic function is normal, with a visually estimated ejection fraction of 60-65%.   3. There is normal right ventricular global systolic function.   4. Right ventricular systolic pressure is within normal limits.   5. The main pulmonary artery is normal in size, and position, with normal bifurcation into the left and right pulmonary arteries.   6. Normal valve structure and function.    6/12/2025 7 Day event monitor  Duration of monitoring 7 days patient is a 40-year-old female.     There were no auto triggered events.  There were 2 patient triggered events.  Patient was in sinus rhythm throughout with heart rates ranging from 92 to 130 bpm.  The 2 triggered events corresponded to sinus rhythm at 92 bpm and sinus tachycardia at 130 bpm.  Normal intervals  No arrhythmia or AV block identified      CBC:   Lab Results   Component Value Date    WBC 6.9 05/06/2025    RBC 4.66 05/06/2025    HGB 11.1 (L) 05/06/2025    HCT 36.6 05/06/2025     (H) 05/06/2025        CMP:    Lab Results    Component Value Date     05/06/2025    K 4.2 05/06/2025     05/06/2025    CO2 26 05/06/2025    BUN 11 05/06/2025    CREATININE 0.56 05/06/2025    GLUCOSE 85 05/06/2025    CALCIUM 9.5 05/06/2025       Lipid Profile:    Lab Results   Component Value Date    TRIG 74 05/06/2025    HDL 54 05/06/2025    LDLCALC 100 (H) 05/06/2025       BMP:  Lab Results   Component Value Date     05/06/2025     05/06/2024    K 4.2 05/06/2025    K 4.5 05/06/2024     05/06/2025     (H) 05/06/2024    CO2 26 05/06/2025    CO2 27 05/06/2024    BUN 11 05/06/2025    BUN 10 05/06/2024    CREATININE 0.56 05/06/2025    CREATININE 0.61 05/06/2024       CBC:  Lab Results   Component Value Date    WBC 6.9 05/06/2025    WBC 8.0 05/06/2024    RBC 4.66 05/06/2025    RBC 4.64 05/06/2024    HGB 11.1 (L) 05/06/2025    HGB 11.7 (L) 05/06/2024    HCT 36.6 05/06/2025    HCT 38.1 05/06/2024    MCV 78.5 (L) 05/06/2025    MCV 82 05/06/2024    MCH 23.8 (L) 05/06/2025    MCH 25.2 (L) 05/06/2024    MCHC 30.3 (L) 05/06/2025    MCHC 30.7 (L) 05/06/2024    RDW 14.9 05/06/2025    RDW 15.6 (H) 05/06/2024     (H) 05/06/2025     05/06/2024    MPV 10.1 05/06/2025       Hepatic Function Panel:    Lab Results   Component Value Date    ALKPHOS 55 05/06/2025    ALT 11 05/06/2025    AST 13 05/06/2025    PROT 7.1 05/06/2025    BILITOT 0.4 05/06/2025       Magnesium:    Lab Results   Component Value Date    MG 2.2 05/06/2025       TSH:    Lab Results   Component Value Date    TSH 1.22 05/06/2025       Problem List[1]    Social History[2]    Medical History[3]    Current Medications[4]    Cephalosporins and Propoxyphene n-acetaminophen    Family History[5]    Surgical History[6]       Review of systems  Constitutional: No weight loss, fever, chills, weakness or fatigue  HEENT: No visual loss, blurred vision, double vision or yellow sclerae  Skin: No rash or itching  Cardiovascular: No chest pain, pressure or discomfort, No  palpitations or edema.  Respiratory: No shortness of breath, cough or sputum  Gastrointestinal: No nausea, vomiting or diarrhea. No bloody or dark tarry stools.  Neurological: No headache, lightheadedness, dizziness, syncope.   Musculoskeletal: No muscle, back pain, joint pain or stiffness.  Hematologic: No anemia, bleeding or bruising.    /80 (BP Location: Right arm, Patient Position: Sitting, BP Cuff Size: Adult)   Pulse 73     Physical Exam  Constitutional: Well developed, awake/alert x 3, no distress.  Head/Neck: No JVD, No bruits  Respiratory/Thorax: patent airways, CTAB, normal breath sounds with good expansion.  Cardiovascular: Regular rate and rhythm, no murmurs, normal S1 and S2,   Gastrointestinal: Non distended, soft, non-tender, no rebound tenderness or guarding.  Extremities: No cyanosis, edema.   Neurological: Alert and oriented x 3. Moves extremities spontaneous with purpose.  Psychological: Appropriate mood and behavior  Skin: Warm and Dry. No lesions or rashes.     Assessment/Plan  Palpitations:  Resolved with discontinuing of energy drinks.   Tachycardia:  Event monitor as noted above.  Improved with discontinuing of energy drinks  Shortness of breath:  Resolved.  Echo as noted above  Follow in office 6 months or sooner if needed.         Please excuse any errors in grammar or translation related to dictation, voice recognition software was used to prepare this document.           [1]   Patient Active Problem List  Diagnosis    Anemia   [2]   Social History  Tobacco Use    Smoking status: Never    Smokeless tobacco: Never   Vaping Use    Vaping status: Never Used   Substance Use Topics    Alcohol use: Not Currently     Comment: rarely    Drug use: Never   [3]   Past Medical History:  Diagnosis Date    Personal history of other diseases of the digestive system 01/22/2018    History of umbilical hernia    Tachycardia, unspecified     Tachyarrhythmia   [4]   Current Outpatient Medications:      PARoxetine (Paxil) 10 mg tablet, Take 1 tablet (10 mg) by mouth once daily in the morning., Disp: 90 tablet, Rfl: 1    diazePAM (Valium) 5 mg tablet, Take 1 tablet (5 mg) by mouth 2 times a day as needed for anxiety, sleep or muscle spasms for up to 15 days. (Patient not taking: Reported on 7/7/2025), Disp: 30 tablet, Rfl: 1    metoprolol succinate XL (Toprol-XL) 25 mg 24 hr tablet, Take 1 tablet (25 mg) by mouth once daily. Do not crush or chew. (Patient not taking: Reported on 7/7/2025), Disp: 30 tablet, Rfl: 5  [5]   Family History  Problem Relation Name Age of Onset    Hyperlipidemia Mother      Hypertension Mother      Heart attack Father      Thyroid disease Sister      Hyperlipidemia Sister      Hypertension Sister      Diabetes type II Maternal Grandmother      Heart attack Paternal Grandfather     [6]   Past Surgical History:  Procedure Laterality Date    APPENDECTOMY  10/02/2017    Appendectomy    UMBILICAL HERNIA REPAIR  03/09/2018    Umbilical Hernia Repair

## 2026-01-05 ENCOUNTER — APPOINTMENT (OUTPATIENT)
Dept: CARDIOLOGY | Facility: CLINIC | Age: 41
End: 2026-01-05
Payer: COMMERCIAL